# Patient Record
Sex: MALE | Race: WHITE
[De-identification: names, ages, dates, MRNs, and addresses within clinical notes are randomized per-mention and may not be internally consistent; named-entity substitution may affect disease eponyms.]

---

## 2017-01-07 ENCOUNTER — HOSPITAL ENCOUNTER (OUTPATIENT)
Dept: HOSPITAL 45 - C.LAB | Age: 57
Discharge: HOME | End: 2017-01-07
Attending: INTERNAL MEDICINE
Payer: COMMERCIAL

## 2017-01-07 DIAGNOSIS — Z12.5: ICD-10-CM

## 2017-01-07 DIAGNOSIS — R73.9: Primary | ICD-10-CM

## 2017-01-07 LAB
ALBUMIN/GLOB SERPL: 1.1 {RATIO} (ref 0.9–2)
ALP SERPL-CCNC: 70 U/L (ref 45–117)
ALT SERPL-CCNC: 43 U/L (ref 12–78)
ANION GAP SERPL CALC-SCNC: 8 MMOL/L (ref 3–11)
AST SERPL-CCNC: 30 U/L (ref 15–37)
BASOPHILS # BLD: 0.01 K/UL (ref 0–0.2)
BASOPHILS NFR BLD: 0.2 %
BUN SERPL-MCNC: 17 MG/DL (ref 7–18)
BUN/CREAT SERPL: 17.3 (ref 10–20)
CALCIUM SERPL-MCNC: 8.5 MG/DL (ref 8.5–10.1)
CHLORIDE SERPL-SCNC: 106 MMOL/L (ref 98–107)
CHOLEST/HDLC SERPL: 3.1 {RATIO}
CO2 SERPL-SCNC: 28 MMOL/L (ref 21–32)
COMPLETE: YES
CREAT SERPL-MCNC: 0.96 MG/DL (ref 0.6–1.4)
EOSINOPHIL NFR BLD AUTO: 154 K/UL (ref 130–400)
EST. AVERAGE GLUCOSE BLD GHB EST-MCNC: 108 MG/DL
GLOBULIN SER-MCNC: 3.4 GM/DL (ref 2.5–4)
GLUCOSE SERPL-MCNC: 88 MG/DL (ref 70–99)
GLUCOSE UR QL: 55 MG/DL
HCT VFR BLD CALC: 42.8 % (ref 42–52)
IG%: 0.2 %
IMM GRANULOCYTES NFR BLD AUTO: 25.5 %
KETONES UR QL STRIP: 91 MG/DL
LYMPHOCYTES # BLD: 1.52 K/UL (ref 1.2–3.4)
MCH RBC QN AUTO: 31.4 PG (ref 25–34)
MCHC RBC AUTO-ENTMCNC: 34.8 G/DL (ref 32–36)
MCV RBC AUTO: 90.3 FL (ref 80–100)
MONOCYTES NFR BLD: 8.4 %
NEUTROPHILS # BLD AUTO: 4.2 %
NEUTROPHILS NFR BLD AUTO: 61.5 %
NITRITE UR QL STRIP: 108 MG/DL (ref 0–150)
PH UR: 168 MG/DL (ref 0–200)
PMV BLD AUTO: 10.2 FL (ref 7.4–10.4)
POTASSIUM SERPL-SCNC: 4.1 MMOL/L (ref 3.5–5.1)
PSA SERPL-MCNC: 1.84 NG/ML (ref 0–4)
RBC # BLD AUTO: 4.74 M/UL (ref 4.7–6.1)
SODIUM SERPL-SCNC: 142 MMOL/L (ref 136–145)
TSH SERPL-ACNC: 1.28 UIU/ML (ref 0.3–4.5)
VERY LOW DENSITY LIPOPROT CALC: 22 MG/DL
WBC # BLD AUTO: 5.96 K/UL (ref 4.8–10.8)

## 2017-01-11 NOTE — CODING QUERY MEDICAL NECESSITY
SUPPORTING DIAGNOSIS NEEDED

 60



A supporting diagnosis is required for the test/procedure performed on this patient in 
order for us to be reimbursed by the patient's insurance. Please provide a supporting 
diagnosis for the following test/procedure listed below next to the test name along with 
your signature. 



*If there is no additional diagnosis for this patient that would support the following 
test/procedure please document that below next to the test/procedure.







DOS 17

Test(s)/Procedure(s) that require a supporting diagnosis:

* PSA                                    DIAGNOSIS:







Provider Signature:  ______________________________  Date:  _______



Thank you  

Natalia Solorzano

Health Information Management

Phone:  [phone]

Fax:  [phone]



Once completed, please kindly fax back to [phone]



For questions please call [phone]

## 2017-03-27 ENCOUNTER — HOSPITAL ENCOUNTER (OUTPATIENT)
Dept: HOSPITAL 45 - C.GI | Age: 57
Discharge: HOME | End: 2017-03-27
Attending: INTERNAL MEDICINE
Payer: COMMERCIAL

## 2017-03-27 VITALS
HEIGHT: 65.98 IN | WEIGHT: 185.39 LBS | BODY MASS INDEX: 29.79 KG/M2 | BODY MASS INDEX: 29.79 KG/M2 | HEIGHT: 65.98 IN | WEIGHT: 185.39 LBS

## 2017-03-27 VITALS — SYSTOLIC BLOOD PRESSURE: 130 MMHG | DIASTOLIC BLOOD PRESSURE: 89 MMHG | HEART RATE: 73 BPM | OXYGEN SATURATION: 98 %

## 2017-03-27 DIAGNOSIS — Z12.11: Primary | ICD-10-CM

## 2017-03-27 DIAGNOSIS — Z79.82: ICD-10-CM

## 2017-03-27 DIAGNOSIS — K44.9: ICD-10-CM

## 2017-03-27 DIAGNOSIS — K22.70: ICD-10-CM

## 2017-03-27 DIAGNOSIS — Z86.010: ICD-10-CM

## 2017-03-27 DIAGNOSIS — K64.8: ICD-10-CM

## 2017-03-27 DIAGNOSIS — K31.7: ICD-10-CM

## 2017-03-27 DIAGNOSIS — Z98.890: ICD-10-CM

## 2017-03-27 PROCEDURE — 43239 EGD BIOPSY SINGLE/MULTIPLE: CPT

## 2017-03-27 NOTE — GI REPORT
Procedure Date: 3/27/2017 8:34 AM

Procedure:            Upper GI endoscopy

Indications:          Follow-up of Arriola's esophagus

Medicines:            Monitored Anesthesia Care

Complications:        No immediate complications.

Estimated Blood Loss: Estimated blood loss: none.

Procedure:            Pre-Anesthesia Assessment:

                      - Prior to the procedure, a History and Physical was 

                      performed, and patient medications and allergies were 

                      reviewed. The patient's tolerance of previous 

                      anesthesia was also reviewed. The risks and benefits of 

                      the procedure and the sedation options and risks were 

                      discussed with the patient. All questions were 

                      answered, and informed consent was obtained. Prior 

                      Anticoagulants: The patient has taken aspirin, last 

                      dose was 5 days prior to procedure. ASA Grade 

                      Assessment: III - A patient with severe systemic 

                      disease. After reviewing the risks and benefits, the 

                      patient was deemed in satisfactory condition to undergo 

                      the procedure.

                      After obtaining informed consent, the endoscope was 

                      passed under direct vision. Throughout the procedure, 

                      the patient's blood pressure, pulse, and oxygen 

                      saturations were monitored continuously. The scope was 

                      introduced through the mouth, and advanced to the 

                      second part of duodenum. The upper GI endoscopy was 

                      accomplished without difficulty. The patient tolerated 

                      the procedure well.

Findings:

     There were esophageal mucosal changes consistent with short-segment 

     Arriola's esophagus present at the gastroesophageal junction. The 

     maximum longitudinal extent of these mucosal changes was 2 cm in length. 

     Biopsies were taken with a cold forceps for histology.

     A small hiatus hernia was present.

     Multiple 2 to 5 mm sessile polyps with no bleeding and no stigmata of 

     recent bleeding were found in the gastric fundus. Biopsies were taken 

     with a cold forceps for histology.

     The examined duodenum was normal.

Impression:           - Esophageal mucosal changes consistent with 

                      short-segment Arriola's esophagus. Biopsied.

                      - Small hiatus hernia.

                      - Multiple gastric polyps. Biopsied.

                      - Normal examined duodenum.

Recommendation:       - Resume previous diet.

                      - Continue present medications.

                      - Await pathology results.

                      - Return to GI office as previously scheduled.

Patrice Solano DO

3/27/2017 8:58:13 AM

This report has been signed electronically.

Note Initiated On: 3/27/2017 8:34 AM

     I attest to the content of the Intraoperative Record and orders 

     documented therein, exceptions below

## 2017-03-27 NOTE — GI REPORT
Procedure Date: 3/27/2017 8:39 AM

THIS REPORT HAS BEEN AMENDED

Addendum Number: 1   Addendum Date: 3/27/2017 4:50:03 PM

     No specimens were collected during this exam, and therefore no pathology 

     is pending.

     Repeat colonoscopy in 5 years as patient has a history of colon polyps.

Procedure:            Colonoscopy

Indications:          High risk colon cancer surveillance: Personal history 

                      of colonic polyps

Medicines:            Monitored Anesthesia Care

Complications:        No immediate complications.

Estimated Blood Loss: Estimated blood loss: none.

Procedure:            Pre-Anesthesia Assessment:

                      - Prior to the procedure, a History and Physical was 

                      performed, and patient medications and allergies were 

                      reviewed. The patient's tolerance of previous 

                      anesthesia was also reviewed. The risks and benefits of 

                      the procedure and the sedation options and risks were 

                      discussed with the patient. All questions were 

                      answered, and informed consent was obtained. Prior 

                      Anticoagulants: The patient has taken aspirin, last 

                      dose was 5 days prior to procedure. ASA Grade 

                      Assessment: III - A patient with severe systemic 

                      disease. After reviewing the risks and benefits, the 

                      patient was deemed in satisfactory condition to undergo 

                      the procedure.

                      After I obtained informed consent, the scope was passed 

                      under direct vision. Throughout the procedure, the 

                      patient's blood pressure, pulse, and oxygen saturations 

                      were monitored continuously. The Scope was introduced 

                      through the anus and advanced to the terminal ileum. 

                      The colonoscopy was performed without difficulty. The 

                      patient tolerated the procedure well. The quality of 

                      the bowel preparation was good. The terminal ileum, 

                      ileocecal valve, appendiceal orifice, and rectum were 

                      photographed.

Findings:

     Non-bleeding internal hemorrhoids were found during retroflexion. The 

     hemorrhoids were small.

     The exam was otherwise without abnormality.

Impression:           - Non-bleeding internal hemorrhoids.

                      - The examination was otherwise normal.

                      - No specimens collected.

Recommendation:       - Resume previous diet.

                      - Continue present medications.

                      - Repeat colonoscopy for surveillance based on 

                      pathology results.

                      - Return to GI office as previously scheduled.

Patrice ANEUDY Solano, DO

3/27/2017 9:01:51 AM

This report has been signed electronically.

Note Initiated On: 3/27/2017 8:39 AM

     I attest to the content of the Intraoperative Record and orders 

     documented therein, exceptions below

Patrice AMADO Russ, DO

3/27/2017 4:51:39 PM

This report has been signed electronically.

## 2017-03-27 NOTE — ENDO HISTORY AND PHYSICAL
History & Physical


Date of Service:


Mar 27, 2017.


Chief Complaint:


HISTORY OF POLYPS


Referring Physician:


DR IOANA MONGE


History of Present Illness


57 yo CM who presents for EGD and Colonoscopy secondary to Arriola's Esophagus 

and history of colon polyps.





Past Surgical History


Hx Cardiac Surgery:  Yes (HEART CATH, NO STENT)


Hx Internal Defibrillator:  No


Hx Pacemaker:  No


Hx Abdominal Surgery:  No


Hx of Implantable Prosthesis:  No


Hx Post-Op Nausea and Vomiting:  No


Hx Cancer Surgery:  No


Hx Thoracic Surgery:  No


Hx Orthopedic:  Yes (RT WRIST REPAIR S/P BREAK WITH HARDWARE)


Hx Urinary Tract Surgery:  No





Family History


None





Social History


Smoking Status:  Never Smoker


Hx Substance Use:  No


Hx Alcohol Use:  Yes (OCCASIONAL)





Allergies


Coded Allergies:  


     No Known Allergies (Verified , 3/27/17)





Current Medications





 Reported Home Medications








 Medications  Dose


 Route/Sig


 Max Daily Dose Days Date Category


 


 Aspirin Ec


  (Aspirin) 81 Mg


 Tab  81 Mg


 PO QPM


    3/16/17 Reported


 


 Lipitor


  (Atorvastatin


 Calcium) 80 Mg Tab  80 Mg


 PO QPM


    3/16/17 Reported


 


 Nitrostat


  (Nitroglycerin)


 0.4 Mg Tab  0.4 Mg


 UT PRN


    10/26/13 Reported


 


 Omega-3 (Fish


 Oil) 1 Ea Cap  1 Cap


 PO QPM


    1/8/09 Reported


 


 Prilosec


  (Omeprazole) 20


 Mg Capcr  20 Mg


 PO QPM


    1/8/09 Reported


 


 Toprol-Xl


  (Metoprolol


 Succinate) 25 Mg


 Tabcr  25 Mg


 PO QPM


    1/8/09 Reported











Vital Signs


Weight (Kilograms):  84.09


Height (Feet):  5


Height (Inches):  6





Physical Exam


General Appearance:  WD/WN, no apparent distress


Respiratory/Chest:  


   Auscultation:  breath sounds normal


Cardiovascular:  


   Heart Auscultation:  RRR


Abdomen:  


   Bowel Sounds:  normal


   Inspection & Palpation:  soft, non-distended, no tenderness, guarding & 

rebound





Assessment and Plan


Assessment:


57 yo CM who presents for EGD and Colonoscopy secondary to Arriola's Esophagus 

and history of colon polyps.








Plan:


Proceed with colonoscopy.

## 2017-03-27 NOTE — DISCHARGE INSTRUCTIONS
Endoscopy Patient Instructions


Date / Procedure(s) Performed


Mar 27, 2017.


Colonoscopy





Allergy Information


Coded Allergies:  


     No Known Allergies (Verified , 3/27/17)





Discharge Date / Findings


Mar 27, 2017.


EGD:  Arriola's Esophagus s/p biopsies, Hiatal hernia, Gastric polyps s/p 

biopsies


Colonoscopy:  Internal hemorrhoids





Medication Instructions


Stopped Medication(s):  


ASPIRIN 81MG LAST DOSE 3/22/17


OK to resume all medications today as prescribed.


 Reported Home Medications








 Medications  Dose


 Route/Sig


 Max Daily Dose Days Date Category


 


 Aspirin Ec


  (Aspirin) 81 Mg


 Tab  81 Mg


 PO QPM


    3/16/17 Reported


 


 Lipitor


  (Atorvastatin


 Calcium) 80 Mg Tab  80 Mg


 PO QPM


    3/16/17 Reported


 


 Nitrostat


  (Nitroglycerin)


 0.4 Mg Tab  0.4 Mg


 UT PRN


    10/26/13 Reported


 


 Omega-3 (Fish


 Oil) 1 Ea Cap  1 Cap


 PO QPM


    1/8/09 Reported


 


 Prilosec


  (Omeprazole) 20


 Mg Capcr  20 Mg


 PO QPM


    1/8/09 Reported


 


 Toprol-Xl


  (Metoprolol


 Succinate) 25 Mg


 Tabcr  25 Mg


 PO QPM


    1/8/09 Reported











Provider Instructions





Activity Restrictions





-  No exercising or heavy lifting for 24 hours. 


-  Do not drink alcohol the day of the procedure.


-  Do not drive a car or operate machinery until the day after the procedure.


-  Do not make any important decisions or sign important papers in 24 hours 

after the procedure.





Following Day:





-  Return to full activity which may include returning to work/school.





Diet





Start your diet with liquids and light foods (jello, soup, juice, toast).  Then 

eat your usual diet if not nauseated.





Treatment For Common After Affects





For mild abdominal pain, bloating, or excessive gas:





-  Rest


-  Eat lightly


-  Lie on right side





Follow-Up Information


Follow-up with DR IOANA MONGE as scheduled





Anesthesia Information





What You Should Know





You have had a procedure that required some medicine to reduce anxiety and 

discomfort. This treatment is called moderate sedation.  


After receiving the treatment, you may be sleepy, but you will be able to 

breathe on your own.  The effects of the treatment may last for several hours.








Follow these instructions along with Activity/Diet recommendations noted above:





*  Do NOT do anything where dizziness or clumsiness would be dangerous.





*  Rest quietly at home today, then you can be up and about tomorrow.





*  Have a responsible person stay with you the rest of today.





*  You may have had an I.V. today.  If so, you may take the dressing off later 

today.





Recommendations


 


Call your doctor if:





*  Trouble breathing 





*  Continuous vomiting for more than 24 hours





*  Temperature above 101 degrees





*  Severe abdominal pain or bloating





*  Pain not relieved by pain medicine ordered





*  There is increased drainage or redness from any incision





*  A large amount of rectal bleeding greater than 2-3 tablespoons. 


   (If you had a polyp/s removed or have hemorrhoids, a small amount of blood -


    from the rectum is to be expected.)





*  You have any unanswered questions or concerns.





IN THE EVENT OF A SERIOUS EMERGENCY, GO TO THE NEAREST EMERGENCY ROOM





       Your discharge instructions were prepared by provider Patrice Solano.


 Patient Instructions Signature Page








Jose Parsons 











Patient (or Guardian) Signature/Date:____________________________________ I 

have read and understand the instructions given to me by my caregivers.








Caregiver/RN/Doctor Signature/Date:____________________________________








The above-named patient and/or guardian has received patient instructions on 

this date.


























+  Original Patient Signature Page (only) stays with chart.  Please make copy 

for patient.

## 2017-03-27 NOTE — ANESTHESIOLOGY PROGRESS NOTE
Anesthesia Post Op Note


Date & Time


Mar 27, 2017 at 09:20





Vital Signs


Pain Intensity:  0





 Vital Signs Past 12 Hours








  Date Time  Temp Pulse Resp B/P Pulse Ox O2 Delivery O2 Flow Rate FiO2


 


3/27/17 09:11  75 16 129/88 98 Room Air  


 


3/27/17 08:56  86 16 109/77 97 Room Air  


 


3/27/17 08:22 36.8 86 18 135/86 95 Room Air  











Notes


Mental Status:  alert / awake / arousable, participated in evaluation


Pt Amnestic to Procedure:  Yes


Nausea / Vomiting:  adequately controlled


Pain:  adequately controlled


Airway Patency, RR, SpO2:  stable & adequate


BP & HR:  stable & adequate


Hydration State:  stable & adequate


Anesthetic Complications:  no major complications apparent

## 2017-07-08 ENCOUNTER — HOSPITAL ENCOUNTER (OUTPATIENT)
Dept: HOSPITAL 45 - C.LAB | Age: 57
Discharge: HOME | End: 2017-07-08
Attending: PHYSICIAN ASSISTANT
Payer: COMMERCIAL

## 2017-07-08 DIAGNOSIS — Z00.00: Primary | ICD-10-CM

## 2017-07-08 LAB
CHOLEST/HDLC SERPL: 3.5 {RATIO}
GLUCOSE UR QL: 46 MG/DL
KETONES UR QL STRIP: 90 MG/DL
NITRITE UR QL STRIP: 122 MG/DL (ref 0–150)
PH UR: 160 MG/DL (ref 0–200)
VERY LOW DENSITY LIPOPROT CALC: 24 MG/DL

## 2018-02-03 ENCOUNTER — HOSPITAL ENCOUNTER (OUTPATIENT)
Dept: HOSPITAL 45 - C.LAB | Age: 58
Discharge: HOME | End: 2018-02-03
Attending: INTERNAL MEDICINE
Payer: COMMERCIAL

## 2018-02-03 DIAGNOSIS — K22.70: ICD-10-CM

## 2018-02-03 DIAGNOSIS — Z00.00: Primary | ICD-10-CM

## 2018-02-03 DIAGNOSIS — Z11.59: ICD-10-CM

## 2018-02-03 DIAGNOSIS — I25.2: ICD-10-CM

## 2018-02-03 DIAGNOSIS — K44.9: ICD-10-CM

## 2018-02-03 DIAGNOSIS — E78.5: ICD-10-CM

## 2018-02-03 DIAGNOSIS — D64.9: ICD-10-CM

## 2018-02-03 DIAGNOSIS — R73.9: ICD-10-CM

## 2018-02-03 DIAGNOSIS — I25.10: ICD-10-CM

## 2018-02-03 LAB
ALBUMIN SERPL-MCNC: 3.7 GM/DL (ref 3.4–5)
ALP SERPL-CCNC: 61 U/L (ref 45–117)
ALT SERPL-CCNC: 40 U/L (ref 12–78)
AST SERPL-CCNC: 24 U/L (ref 15–37)
BASOPHILS # BLD: 0.01 K/UL (ref 0–0.2)
BASOPHILS NFR BLD: 0.2 %
BUN SERPL-MCNC: 16 MG/DL (ref 7–18)
CALCIUM SERPL-MCNC: 8.6 MG/DL (ref 8.5–10.1)
CO2 SERPL-SCNC: 26 MMOL/L (ref 21–32)
CREAT SERPL-MCNC: 0.97 MG/DL (ref 0.6–1.4)
EOS ABS #: 0.27 K/UL (ref 0–0.5)
EOSINOPHIL NFR BLD AUTO: 164 K/UL (ref 130–400)
GLUCOSE SERPL-MCNC: 87 MG/DL (ref 70–99)
HBA1C MFR BLD: 5.4 % (ref 4.5–5.6)
HCT VFR BLD CALC: 42.8 % (ref 42–52)
HGB BLD-MCNC: 14.7 G/DL (ref 14–18)
IG#: 0 K/UL (ref 0–0.02)
IMM GRANULOCYTES NFR BLD AUTO: 31.1 %
KETONES UR QL STRIP: 66 MG/DL
LYMPHOCYTES # BLD: 1.53 K/UL (ref 1.2–3.4)
MCH RBC QN AUTO: 31.4 PG (ref 25–34)
MCHC RBC AUTO-ENTMCNC: 34.3 G/DL (ref 32–36)
MCV RBC AUTO: 91.5 FL (ref 80–100)
MONO ABS #: 0.42 K/UL (ref 0.11–0.59)
MONOCYTES NFR BLD: 8.5 %
NEUT ABS #: 2.69 K/UL (ref 1.4–6.5)
NEUTROPHILS # BLD AUTO: 5.5 %
NEUTROPHILS NFR BLD AUTO: 54.7 %
PH UR: 130 MG/DL (ref 0–200)
PMV BLD AUTO: 10.7 FL (ref 7.4–10.4)
POTASSIUM SERPL-SCNC: 3.8 MMOL/L (ref 3.5–5.1)
PROT SERPL-MCNC: 7.2 GM/DL (ref 6.4–8.2)
RED CELL DISTRIBUTION WIDTH CV: 12.8 % (ref 11.5–14.5)
RED CELL DISTRIBUTION WIDTH SD: 42.6 FL (ref 36.4–46.3)
SODIUM SERPL-SCNC: 138 MMOL/L (ref 136–145)
WBC # BLD AUTO: 4.92 K/UL (ref 4.8–10.8)

## 2018-07-31 ENCOUNTER — HOSPITAL ENCOUNTER (OUTPATIENT)
Dept: HOSPITAL 45 - C.LAB | Age: 58
Discharge: HOME | End: 2018-07-31
Attending: INTERNAL MEDICINE
Payer: COMMERCIAL

## 2018-07-31 DIAGNOSIS — E78.5: ICD-10-CM

## 2018-07-31 DIAGNOSIS — Z00.00: Primary | ICD-10-CM

## 2018-07-31 DIAGNOSIS — K21.9: ICD-10-CM

## 2018-07-31 DIAGNOSIS — I25.10: ICD-10-CM

## 2018-07-31 DIAGNOSIS — R73.9: ICD-10-CM

## 2018-07-31 DIAGNOSIS — K22.70: ICD-10-CM

## 2018-07-31 DIAGNOSIS — K44.9: ICD-10-CM

## 2018-07-31 LAB
ALBUMIN SERPL-MCNC: 3.7 GM/DL (ref 3.4–5)
ALP SERPL-CCNC: 56 U/L (ref 45–117)
ALT SERPL-CCNC: 31 U/L (ref 12–78)
AST SERPL-CCNC: 25 U/L (ref 15–37)
BASOPHILS # BLD: 0.01 K/UL (ref 0–0.2)
BASOPHILS NFR BLD: 0.2 %
BUN SERPL-MCNC: 20 MG/DL (ref 7–18)
CALCIUM SERPL-MCNC: 8.5 MG/DL (ref 8.5–10.1)
CO2 SERPL-SCNC: 29 MMOL/L (ref 21–32)
CREAT SERPL-MCNC: 0.91 MG/DL (ref 0.6–1.4)
EOS ABS #: 0.21 K/UL (ref 0–0.5)
EOSINOPHIL NFR BLD AUTO: 170 K/UL (ref 130–400)
GLUCOSE SERPL-MCNC: 93 MG/DL (ref 70–99)
HBA1C MFR BLD: 5.6 % (ref 4.5–5.6)
HCT VFR BLD CALC: 42.9 % (ref 42–52)
HGB BLD-MCNC: 14.5 G/DL (ref 14–18)
IG#: 0.01 K/UL (ref 0–0.02)
IMM GRANULOCYTES NFR BLD AUTO: 30.9 %
KETONES UR QL STRIP: 68 MG/DL
LYMPHOCYTES # BLD: 1.67 K/UL (ref 1.2–3.4)
MCH RBC QN AUTO: 31 PG (ref 25–34)
MCHC RBC AUTO-ENTMCNC: 33.8 G/DL (ref 32–36)
MCV RBC AUTO: 91.7 FL (ref 80–100)
MONO ABS #: 0.5 K/UL (ref 0.11–0.59)
MONOCYTES NFR BLD: 9.2 %
NEUT ABS #: 3.01 K/UL (ref 1.4–6.5)
NEUTROPHILS # BLD AUTO: 3.9 %
NEUTROPHILS NFR BLD AUTO: 55.6 %
PH UR: 155 MG/DL (ref 0–200)
PMV BLD AUTO: 11 FL (ref 7.4–10.4)
POTASSIUM SERPL-SCNC: 3.9 MMOL/L (ref 3.5–5.1)
PROT SERPL-MCNC: 7 GM/DL (ref 6.4–8.2)
RED CELL DISTRIBUTION WIDTH CV: 13.4 % (ref 11.5–14.5)
RED CELL DISTRIBUTION WIDTH SD: 44.7 FL (ref 36.4–46.3)
SODIUM SERPL-SCNC: 138 MMOL/L (ref 136–145)
WBC # BLD AUTO: 5.41 K/UL (ref 4.8–10.8)